# Patient Record
Sex: FEMALE | Race: WHITE | NOT HISPANIC OR LATINO
[De-identification: names, ages, dates, MRNs, and addresses within clinical notes are randomized per-mention and may not be internally consistent; named-entity substitution may affect disease eponyms.]

---

## 2022-11-08 ENCOUNTER — DASHBOARD ENCOUNTERS (OUTPATIENT)
Age: 72
End: 2022-11-08

## 2022-11-08 ENCOUNTER — WEB ENCOUNTER (OUTPATIENT)
Dept: URBAN - METROPOLITAN AREA CLINIC 63 | Facility: CLINIC | Age: 72
End: 2022-11-08

## 2022-11-08 ENCOUNTER — OFFICE VISIT (OUTPATIENT)
Dept: URBAN - METROPOLITAN AREA CLINIC 63 | Facility: CLINIC | Age: 72
End: 2022-11-08
Payer: MEDICARE

## 2022-11-08 VITALS
DIASTOLIC BLOOD PRESSURE: 78 MMHG | WEIGHT: 124 LBS | HEIGHT: 60 IN | HEART RATE: 63 BPM | OXYGEN SATURATION: 98 % | SYSTOLIC BLOOD PRESSURE: 120 MMHG | TEMPERATURE: 98.3 F | BODY MASS INDEX: 24.35 KG/M2

## 2022-11-08 DIAGNOSIS — R10.9 ABDOMINAL PAIN, UNSPECIFIED ABDOMINAL LOCATION: ICD-10-CM

## 2022-11-08 PROCEDURE — 99204 OFFICE O/P NEW MOD 45 MIN: CPT | Performed by: INTERNAL MEDICINE

## 2022-11-08 RX ORDER — TEMAZEPAM 30 MG/1
TAKE 1 CAPSULE BY MOUTH EVERY NIGHT CAPSULE ORAL
Qty: 90 EACH | Refills: 0 | Status: ACTIVE | COMMUNITY

## 2022-11-08 RX ORDER — ATORVASTATIN CALCIUM 20 MG/1
TABLET, FILM COATED ORAL
Qty: 90 TABLET | Status: ACTIVE | COMMUNITY

## 2022-11-08 RX ORDER — MELATONIN 10 MG
AS DIRECTED CAPSULE ORAL
Status: ACTIVE | COMMUNITY

## 2022-11-08 RX ORDER — MUPIROCIN 20 MG/G
OINTMENT TOPICAL
Qty: 22 GRAM | Status: ACTIVE | COMMUNITY

## 2022-11-08 RX ORDER — FERROUS SULFATE 325(65) MG
1 TABLET TABLET ORAL ONCE A DAY
Status: ACTIVE | COMMUNITY

## 2022-11-08 NOTE — HPI-TODAY'S VISIT:
Thank you very much for kindly referring this pleasant 72-year-old woman with a history of non-small cell lung cancer treated with chemoradiation 10 years ago, hyperlipidemia, osteoporosis, LOGAN.  She has a surgical history of a hysterectomy and tumor removal from benign lesions of an ovary.  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  She comes in today to discuss a "flare" of episodic abdominal discomfort.  It is somewhat migratory present throughout her abdomen.  It is intermittent in nature.  At the current time she says she feels well and is asymptomatic but this process tends to be cyclical.  She had a CT of the abdomen recently that showed diverticulosis but no evidence of diverticulitis.  She reports a colonoscopy about a year ago by Dr. Mckeon.  She deferred today as she was unable to get an appointment with Dr. Lennox Kate denies any rectal bleeding or melena.  She denied having nausea or vomiting.  Her weight is stable.  As noted she feels well today. We had a lengthy discussion regarding the nature of her symptoms.  With a negative CT and colonoscopy they appear to be functional.  She may have a component of IBS.  She likely has some degree of intra-abdominal adhesions.  I have told her that she can safely keep an eye on this for the time being to look for any warning signs which would be blood in the stool, black stool, fever chills or sweats, or pain that does not dissipate on its own within a day or 2.  Should any of those occur she will let me know right away.  Otherwise she can go on a Elim [employer] trial of a fiber supplement and keep a diet diary and also instituted a trial of lactose avoidance.  Would be happy to see her again in follow-up as needed.